# Patient Record
Sex: FEMALE | Race: ASIAN | NOT HISPANIC OR LATINO | Employment: FULL TIME | URBAN - METROPOLITAN AREA
[De-identification: names, ages, dates, MRNs, and addresses within clinical notes are randomized per-mention and may not be internally consistent; named-entity substitution may affect disease eponyms.]

---

## 2024-09-16 ENCOUNTER — OFFICE VISIT (OUTPATIENT)
Dept: OBGYN CLINIC | Facility: CLINIC | Age: 39
End: 2024-09-16
Payer: COMMERCIAL

## 2024-09-16 VITALS
HEIGHT: 60 IN | SYSTOLIC BLOOD PRESSURE: 138 MMHG | DIASTOLIC BLOOD PRESSURE: 87 MMHG | BODY MASS INDEX: 18.85 KG/M2 | WEIGHT: 96 LBS | HEART RATE: 84 BPM

## 2024-09-16 DIAGNOSIS — M65.4 DE QUERVAIN'S TENOSYNOVITIS, RIGHT: Primary | ICD-10-CM

## 2024-09-16 DIAGNOSIS — M79.641 RIGHT HAND PAIN: ICD-10-CM

## 2024-09-16 PROCEDURE — 99213 OFFICE O/P EST LOW 20 MIN: CPT | Performed by: PHYSICIAN ASSISTANT

## 2024-09-16 PROCEDURE — 20550 NJX 1 TENDON SHEATH/LIGAMENT: CPT | Performed by: PHYSICIAN ASSISTANT

## 2024-09-16 RX ORDER — LIDOCAINE HYDROCHLORIDE 10 MG/ML
0.5 INJECTION, SOLUTION INFILTRATION; PERINEURAL
Status: COMPLETED | OUTPATIENT
Start: 2024-09-16 | End: 2024-09-16

## 2024-09-16 RX ORDER — TRIAMCINOLONE ACETONIDE 40 MG/ML
40 INJECTION, SUSPENSION INTRA-ARTICULAR; INTRAMUSCULAR
Status: COMPLETED | OUTPATIENT
Start: 2024-09-16 | End: 2024-09-16

## 2024-09-16 RX ADMIN — LIDOCAINE HYDROCHLORIDE 0.5 ML: 10 INJECTION, SOLUTION INFILTRATION; PERINEURAL at 15:30

## 2024-09-16 RX ADMIN — TRIAMCINOLONE ACETONIDE 40 MG: 40 INJECTION, SUSPENSION INTRA-ARTICULAR; INTRAMUSCULAR at 15:30

## 2024-09-16 NOTE — PROGRESS NOTES
Orthopaedic Surgery - Office Note  Gunjan ALEXANDER (38 y.o. female)   : 1985   MRN: [id number]  Encounter Date: 2024    Chief Complaint   Patient presents with    Right Hand - Follow-up         Assessment/Plan  Diagnoses and all orders for this visit:    De Quervain's tenosynovitis, right    Right hand pain    Other orders  -     Hand/upper extremity injection    The diagnosis as well as treatment options were reviewed with the patient in the office today.  I would recommend icing the wrist 20 minutes on 1 hour off 3 times a day.  Patient will use a thumb spica brace to be worn at night and then during the day as desired for symptomatic activities.  Patient will start an oral anti-inflammatory such as Aleve 1 tablet twice daily with food stopping and calling if any stomach upset occurs.  In addition patient will consider topical anti-inflammatory gel such as Voltaren/diclofenac to be applied as directed on the over-the-counter product.  Patient will restart occupational therapy home exercise plan and may consider another referral for evaluation and treatment.    The risks and benefits of a cortisone injection were reviewed.  Shared decision making was utilized.  All question and concerns were reviewed and patient elected to undergo the injection, tolerating it well.  Patient did note significant decrease in pain symptoms postinjection and prior to leaving the exam room.    Patient will return for repeat evaluation in 6 weeks, earlier if symptoms are not improving with conservative care.  All question and concerns were answered in the office today.      Return for Recheck in 6 weeks with myself.        History of Present Illness  This is a previous patient seen by myself and diagnosed with de Quervain's tenosynovitis.  She wished to treat conservatively and was seen on 2020 for and 2024.  She did not receive a cortisone injection at either those visits.  She did attend occupational  "therapy, ice, use topical and oral anti-inflammatories.  The symptoms started after caring for a young child in April 2024.  No new injuries are reported.  Patient reports she would like to consider the injection as she has tried all the conservative options discussed at previous visits without relief of her symptoms.    Review of Systems  Pertinent items are noted in HPI.  All other systems were reviewed and are negative.    Physical Exam  /87 (BP Location: Left arm, Patient Position: Sitting, Cuff Size: Standard) Comment: has child w and child is stressed thinking its for him  Pulse 84   Ht 5' (1.524 m)   Wt 43.5 kg (96 lb)   BMI 18.75 kg/m²   Cons: Appears well.  No apparent distress.  Psych: Alert. Oriented x3.  Mood and affect normal.    Patient's right wrist is without skin breakdown lesion or signs of infection.  She is mildly tender through the first radial compartment with a mildly positive Finkelstein's test.  The rest of her hand and wrist exam is unremarkable with no tenderness to palpation either light or deep.  She has full active range of motion to wrist extension, flexion, radial deviation, supination, pronation, and ulnar deviation.   strength and pinch strength are 5 out of 5 but painful.  She has no tenderness in the anatomical snuffbox.  There is no crepitus through her range of motion.  She is nontender at the ulnar styloid.  Her elbow exam is unremarkable             Studies Reviewed  Previous orthopedic and Occupational Therapy notes were reviewed by myself in the office today    Hand/upper extremity injection: R extensor compartment 1  Universal Protocol:  Consent: Verbal consent obtained.  Risks and benefits: risks, benefits and alternatives were discussed  Consent given by: patient  Time out: Immediately prior to procedure a \"time out\" was called to verify the correct patient, procedure, equipment, support staff and site/side marked as required.  Patient understanding: patient " states understanding of the procedure being performed  Patient consent: the patient's understanding of the procedure matches consent given  Relevant documents: relevant documents present and verified  Test results: test results available and properly labeled  Site marked: the operative site was marked  Radiology Images displayed and confirmed. If images not available, report reviewed: imaging studies available  Patient identity confirmed: verbally with patient  Supporting Documentation  Indications: pain and tendon swelling   Procedure Details  Condition:de Quervain's tenosynovitis Site: R extensor compartment 1   Preparation: Patient was prepped and draped in the usual sterile fashion  Needle size: 22 G  Ultrasound guidance: no  Medications administered: 0.5 mL lidocaine 1 %; 40 mg triamcinolone acetonide 40 mg/mL  Patient tolerance: patient tolerated the procedure well with no immediate complications  Dressing:  Sterile dressing applied    Patient had complete resolution of pain symptoms postinjection and was quite happy.           Medical, Surgical, Family, and Social History  The patient's medical history, family history, and social history, were reviewed and updated as appropriate.    Past Medical History:   Diagnosis Date    Abdominal pain     Impaction of colon (HCC)        Past Surgical History:   Procedure Laterality Date    TOOTH EXTRACTION      Last assessed: 1/12/16       Family History   Adopted: Yes   Problem Relation Age of Onset    No Known Problems Mother     No Known Problems Father        Social History     Occupational History    Not on file   Tobacco Use    Smoking status: Never    Smokeless tobacco: Never   Substance and Sexual Activity    Alcohol use: Yes     Comment: rarely    Drug use: No    Sexual activity: Not on file       Allergies   Allergen Reactions    Pollen Extract Sneezing    Other      cat         Current Outpatient Medications:     acetaminophen (TYLENOL) 325 mg tablet, Take 2  tablets (650 mg total) by mouth every 4 (four) hours as needed for mild pain, Disp: 30 tablet, Rfl: 0    Bioflavonoid Products (ANCELMO C PO), Take by mouth, Disp: , Rfl:     dicyclomine (BENTYL) 10 mg capsule, Take 1 capsule (10 mg total) by mouth 3 (three) times a day before meals, Disp: 60 capsule, Rfl: 1    docusate sodium (COLACE) 100 mg capsule, Take 1 capsule (100 mg total) by mouth 2 (two) times a day, Disp: , Rfl: 0    ECHINACEA EXTRACT PO, Take by mouth, Disp: , Rfl:     famotidine (PEPCID) 40 MG tablet, Take 1 tablet (40 mg total) by mouth daily, Disp: 30 tablet, Rfl: 2    ibuprofen (MOTRIN) 200 mg tablet, Take 3 tablets (600 mg total) by mouth every 6 (six) hours as needed for moderate pain, Disp: , Rfl:     Probiotic Product (CULTURELLE PRO-WELL) CAPS, Take 1 capsule by mouth daily, Disp: , Rfl:     promethazine-dextromethorphan (PHENERGAN-DM) 6.25-15 mg/5 mL oral syrup, Take 5 mL by mouth 4 (four) times a day as needed for cough, Disp: 118 mL, Rfl: 0      Dandre Hodgson PA-C

## 2024-09-16 NOTE — PATIENT INSTRUCTIONS
"Patient Education     de Quervain tendinopathy   The Basics   Written by the doctors and editors at [facility]   What is de Quervain tendinopathy? -- de Quervain tendinopathy is a condition that causes pain in the thumb and wrist. It is caused by a problem with a tendon. Tendons are strong bands of tissue that connect muscles to bones. de Quervain tendinopathy is sometimes called \"de Quervain tenosynovitis.\"  de Quervain tendinopathy involves tendons that connect the forearm muscles to the thumb. These tendons and the covering around them get inflamed. This causes symptoms.  Most often, de Quervain tendinopathy happens when people use their wrist and thumb too much in certain ways. This includes gripping or grabbing objects (like a tool, golf club, or tennis racket) over and over. But, it can also happen to people for no obvious reason.  What are the symptoms of de Quervain tendinopathy? -- Symptoms include:   Pain in the wrist or thumb   Trouble gripping objects   Swelling in the wrist  Will I need tests? -- Probably not. Your doctor can usually tell if you have de Quervain tendinopathy by learning about your symptoms and doing an exam. During the exam, they will carefully check your thumb, hand, and wrist.  How is de Quervain tendinopathy treated? -- Treatment includes:   Resting your thumb - To avoid moving your thumb, you can wear a splint made for keeping the thumb still.   Ice - You can put a cold gel pack, bag of ice, or bag of frozen vegetables on the painful or swollen area every 4 to 6 hours, for 15 minutes each time.   Pain-relieving medicines called \"NSAIDs\" - NSAIDs are a large group of medicines that includes ibuprofen (sample brand names: Advil, Motrin) and naproxen (sample brand name: Aleve).   Exercises - After your symptoms improve, your doctor or nurse will show you exercises to help your wrist and thumb move more easily.  If your symptoms don't get better with treatment, your doctor might recommend " other treatments. These can include:   Getting a shot of a steroid medicine around the tendon in your wrist - This can help with pain.   Surgery to cut or loosen the covering around the tendon  All topics are updated as new evidence becomes available and our peer review process is complete.  This topic retrieved from [employer] on: Feb 26, 2024.  Topic 50447 Version 12.0  Release: 32.2.4 - C32.56  © 2024 UpToDate, Inc. and/or its affiliates. All rights reserved.  Consumer Information Use and Disclaimer   Disclaimer: This generalized information is a limited summary of diagnosis, treatment, and/or medication information. It is not meant to be comprehensive and should be used as a tool to help the user understand and/or assess potential diagnostic and treatment options. It does NOT include all information about conditions, treatments, medications, side effects, or risks that may apply to a specific patient. It is not intended to be medical advice or a substitute for the medical advice, diagnosis, or treatment of a health care provider based on the health care provider's examination and assessment of a patient's specific and unique circumstances. Patients must speak with a health care provider for complete information about their health, medical questions, and treatment options, including any risks or benefits regarding use of medications. This information does not endorse any treatments or medications as safe, effective, or approved for treating a specific patient. UpToDate, Inc. and its affiliates disclaim any warranty or liability relating to this information or the use thereof.The use of this information is governed by the Terms of Use, available at https://www.wolterskluwer.com/en/know/clinical-effectiveness-terms. 2024© UpToDate, Inc. and its affiliates and/or licensors. All rights reserved.  Copyright   © 2024 UpToDate, Inc. and/or its affiliates. All rights reserved.

## 2024-12-14 ENCOUNTER — HOSPITAL ENCOUNTER (OUTPATIENT)
Dept: RADIOLOGY | Facility: HOSPITAL | Age: 39
Discharge: HOME | End: 2024-12-14
Payer: COMMERCIAL

## 2024-12-14 DIAGNOSIS — N92.1 METRORRHAGIA: ICD-10-CM

## 2024-12-14 PROCEDURE — 76830 TRANSVAGINAL US NON-OB: CPT

## 2024-12-14 PROCEDURE — 76856 US EXAM PELVIC COMPLETE: CPT

## 2025-05-07 ENCOUNTER — TELEPHONE (OUTPATIENT)
Age: 40
End: 2025-05-07

## 2025-05-07 NOTE — TELEPHONE ENCOUNTER
"Who called:STAFF     Is the patient Pregnant ?No  If so, How many weeks? N/A    Reason for the Call:Insurance inquiry    Action Taken: Spoke with patient      Outcome/Plan/ Recommendations: Made patient aware we do not \"work with\" insurance plans, we have to be \"in network\" for the patient to be covered under her insurance. I did give the patient the tax ID and NPI numbers and patient stated she would call them back. Patient said they are a third party plan. Patient will keep appointment for now and cancel if she is not covered by her insurance.  "

## 2025-05-07 NOTE — TELEPHONE ENCOUNTER
NP called to schedule annual exam with Dr. Nina Muñoz. She is scheduled for NP yearly on 7/15/25.     Pt communicated having new insurance - [employer] Plan. Discussed calling insurance and providing our tax ID # and provider's NPI. Pt communicated she tried to verify coverage before and insurance company stated office needs to call to ensure provider will work with plan.     [employer]  Effective: 3/1/25  Tele# [phone]  Member ID# [id number]  Group # [id number]    She is requesting a c/b to verify insurance is okay for her upcoming appt. Thank you.

## 2025-06-02 ENCOUNTER — ULTRASOUND (OUTPATIENT)
Dept: OBGYN CLINIC | Facility: MEDICAL CENTER | Age: 40
End: 2025-06-02

## 2025-06-02 ENCOUNTER — OFFICE VISIT (OUTPATIENT)
Dept: OBGYN CLINIC | Facility: MEDICAL CENTER | Age: 40
End: 2025-06-02

## 2025-06-02 VITALS
DIASTOLIC BLOOD PRESSURE: 60 MMHG | BODY MASS INDEX: 20.2 KG/M2 | WEIGHT: 102.9 LBS | HEIGHT: 60 IN | SYSTOLIC BLOOD PRESSURE: 100 MMHG

## 2025-06-02 DIAGNOSIS — Z32.01 PREGNANCY EXAMINATION OR TEST, POSITIVE RESULT: Primary | ICD-10-CM

## 2025-06-02 DIAGNOSIS — Z36.9 ANTENATAL SCREENING ENCOUNTER: Primary | ICD-10-CM

## 2025-06-02 DIAGNOSIS — Z32.01 PREGNANCY EXAMINATION OR TEST, POSITIVE RESULT: ICD-10-CM

## 2025-06-02 RX ORDER — OMEGA-3S/DHA/EPA/FISH OIL/D3 300MG-1000
400 CAPSULE ORAL DAILY
COMMUNITY

## 2025-06-02 NOTE — PROGRESS NOTES
I  personally reviewed the images and that you agree with the findings.     EDC is LMP based 1/16/26  Sonogram - 1/18/25

## 2025-06-02 NOTE — PROGRESS NOTES
Procedures    Serial transvaginal images were obtained through the gravid maternal uterus. The patient's LMP was 4/11/2025 with an CARROLL of  1/16/2026 and a gestational age of  7w3d (based on LMP).   The indication for today's examination is to confirm fetal size and viability.    CRL=  1.04cm     7w1d    CARROLL by US 01/18/2025  YS=  5.8mm  FHR=  149bpm    The uterus demonstrates a tiny subchorionic bleed, less than 1cm, otherwise, uterus and right ovary appear within normal limits. The left ovary demonstrates a 2.2cm corpus luteal cyst. No free fluid.     Uterus= 9.52 x 6.09 x 6.24cm  Rt Ovary= 2.55 x 1.18 x 1.31cm  Lt. Ovary= 3.54 x 2.90 x 3.39cm    Impression: Single, viable IUP     Umm Nicolas RDMS    GE POSLavu F8 W3Db-IV transvaginal transducer Serial # [id number] was used to perform the examination today and subsequently followed with high level disinfection utilizing Trophon EPR procedure.     Ultrasound performed at:     [facility]  [address] [place], [place] [address]  [place] [address]  Phone:  [phone]  Fax:  [phone]

## 2025-06-20 ENCOUNTER — INITIAL PRENATAL (OUTPATIENT)
Dept: OBGYN CLINIC | Facility: MEDICAL CENTER | Age: 40
End: 2025-06-20

## 2025-06-20 VITALS
HEIGHT: 60 IN | SYSTOLIC BLOOD PRESSURE: 100 MMHG | WEIGHT: 104.6 LBS | BODY MASS INDEX: 20.54 KG/M2 | DIASTOLIC BLOOD PRESSURE: 58 MMHG

## 2025-06-20 DIAGNOSIS — Z34.81 PRENATAL CARE, SUBSEQUENT PREGNANCY, FIRST TRIMESTER: Primary | ICD-10-CM

## 2025-06-20 DIAGNOSIS — N39.3 STRESS INCONTINENCE IN PREGNANCY: ICD-10-CM

## 2025-06-20 DIAGNOSIS — O99.891 STRESS INCONTINENCE IN PREGNANCY: ICD-10-CM

## 2025-06-20 PROCEDURE — NOBC

## 2025-06-20 RX ORDER — DIPHENHYDRAMINE HYDROCHLORIDE 25 MG/1
25 CAPSULE ORAL DAILY
COMMUNITY

## 2025-06-20 NOTE — PATIENT INSTRUCTIONS
Congratulations on your pregnancy!  We thank you for allowing us to participate in your care.    NEXT STEPS    Go to the lab to have your prenatal bloodwork completed if you have not already done so.  There is a listing of [facility] and locations in your prenatal folder. You may also visit [email or web address] or call [phone].   Please be aware that some insurance companies may require you to go to a specific lab (ex. [email or web address] or [employer]). You can verify this by contacting your insurance company.   If you are interested in optional carrier screening for Cystic Fibrosis and Spinal Muscular Atrophy, cost will be based on your medical policy, deductible, and co-insurance. Billing is done directly with [employer] and your insurance.  If you have any additional questions, please reach out to [employer] directly [phone]. They may ask you for CPT codes,  CF is   Please have your blood work completed prior to you next prenatal visit.    If you have decided to have genetic testing done at [facility], that will be scheduled by [facility]. You may have already scheduled this appointment.  If not, please call their office to schedule this appointment.  Based on the referral placed by our office, they will know how to schedule you appropriately.    Contact information for [facility] is located in your prenatal folder. The main phone number to their office is [phone].    Return to our office for your first routine prenatal visit.   [facility] has multiple locations. Always check the address of your appointment to ensure you are going to the correct office.       Warning Signs During Pregnancy - If you experience any problems or concerns, call the office directly.  The list below includes warning signs your providers would like you to be aware of.  If you experience any of these at any time during your pregnancy, please call us as soon as possible.   Vaginal bleeding  Sharp abdominal pain  that does not go away  Fever (more than 100.4?F and is not relieved with Tylenol)  Persistent vomiting lasting greater than 24 hours  Chest pain/Shortness of breath  Pain or burning when you urinate     Call the OFFICE [phone] for any questions/emergencies.  At night or on the weekend, calls go through a triage service, please indicate it is an emergency and the DOCTOR on call will be paged.    Remember to only use [facility] for none urgent concerns or questions.    Our doctors deliver at [facility] in [place]. The address [place] [address]    Please click on the link below to review our Pregnancy Essential Guide.    [facility] Pregnancy Essentials Guide  [facility] Women's [facility] ([email or web address])     Click on the link below to review [facility] Lab locations.  [facility] Lab Locations    [employer] resource  [employer] is a tool to connect you to community resources you may need.

## 2025-06-20 NOTE — PROGRESS NOTES
OB INTAKE INTERVIEW 2025    Patient is 39 y.o. who presents for OB intake at 10w0d.  She is unaccompanied during this encounter.  The father of her baby (Leon) is involved in the pregnancy.      Patient's last menstrual period was 2025 (exact date).  Ultrasound: Measured 7 weeks 1 days on 2025  Estimated Date of Delivery: 26 confirmed by dating ultrasound.    Signs/Symptoms of Pregnancy  Current pregnancy symptoms: fatigue, nausea and occasional vomiting  Constipation no  Headaches no  Cramping/spotting no  PICA cravings no    Diabetes  Body mass index is 20.43 kg/m².  If patient has 1 or more, please order early 1 hour GTT  History of GDM no  BMI >35 no  History of PCOS or current metformin use no  History of LGA/macrosomic infant (4000g/9lbs) no    If patient has 2 or more, please order early 1 hour GTT  BMI>30 no  Patient's age 35 years or older as of estimated date of delivery (AMA) yes  First degree relative with type 2 diabetes - adopted  History of chronic HTN, hyperlipidemia, elevated A1C no  High risk race (, , ,  or ) yes    Hypertension  History of of chronic HTN no  History of gestational HTN no  History of preeclampsia, eclampsia, or HELLP syndrome no  History of diabetes no  History of lupus, sjogrens syndrome, kidney disease no    Thyroid  History of thyroid disease no    Bleeding Disorder or Hx of DVT (Factor V, antithrombin III, prothrombin gene mutation, protein C and S Ag, lupus anticoagulant, anticardiolipin, beta-2 glycoprotein): no    OB/GYN:  History of abnormal pap smear no       Date of last pap smear - patient reports   History of HPV no  History of Herpes/HSV h/o cold sores  History of other STI (gonorrhea, chlamydia, trich) no  History of prior  yes - VAVD  History of prior  no  History of  delivery prior to 36 weeks 6 days no  History of blood transfusion no  Ok for blood  transfusion yes    Substance screening:  History of tobacco use no  Currently using tobacco no  Substance Use Screen Level - no risk     MRSA Screening:   Does the pt have a hx of MRSA? no    Immunizations:  History of Varicella - as a child  Influenza vaccine given this season n/a  Discussed Tdap vaccine YES   COVID Vaccine x2    Genetic/M:  Do you or your partner have a history of any of the following in yourselves or first degree relatives?  Cystic fibrosis no  Spinal muscular atrophy no  Hemoglobinopathy/Sickle Cell/Thalassemia no  Fragile X Intellectual Disability no    Patient reports carrier screening completed with previous pregnancy. Both CF and SMA negative.    Appointment for Nuchal Translucency Ultrasound at [facility] is scheduled for 7/8.    Interview education  [facility] Book reviewed, discussed and attached to their AVS YES     Prenatal lab work scripts YES      Aspirin/Preeclampsia Screen    Risk Level Risk Factor Recommendation   LOW Prior Uncomplicated full-term delivery yes No Aspirin recommendation        MODERATE Nulliparity no Recommend low-dose aspirin if     BMI>30 no 2 or more moderate risk factors    Family History Preeclampsia (mother/sister) no - adopted     35yr old or greater as of CARROLL yes     Black Race, Concern for SDOH/Low Socioeconomic no     IVF Pregnancy  no     Personal History Risks (low birth weight, prior adverse preg outcome, >10yr preg interval) no         HIGH History of Preeclampsia no Recommend low-dose aspirin if     Multifetal gestation no 1 or more high risk factors    Chronic HTN no     Type 1 or 2 Diabetes no     Renal Disease no     Autoimmune Disease  no      Contraindications to ASA therapy:  NSAID/ ASA allergy: no  Nasal polyps: no  Asthma with history of ASA induced bronchospasm: no  Relative contraindications:  History of GI bleed: no  Active peptic ulcer disease: no  Severe hepatic dysfunction: no    Patient does not meet recommendation to  take ASA 162mg during this pregnancy from 12-36wks to lower her risk of preeclampsia.      The patient has a history now or in prior pregnancy notable for: VAVD and 3b laceration in 2022    Details that I feel the provider should be aware of: Gunjan came in for her OB intake at 10 weeks. Patient c/o fatigue, nausea and occasional vomiting. Safe medications to take during pregnancy and warning signs reviewed. Patient with h/o VAVD and 3b laceration in 2022. Encouraged to complete prenatal panel and early 1 hour glucose. Patient has NT scheduled with MFM on 7/8.    PN1 visit scheduled. The patient was oriented to our practice, the navigator role, reviewed delivering physicians and [facility] for delivery. All questions were answered.    Interviewed by: Rafa WEBB RN

## 2025-06-26 ENCOUNTER — APPOINTMENT (OUTPATIENT)
Dept: LAB | Facility: IMAGING CENTER | Age: 40
End: 2025-06-26
Payer: COMMERCIAL

## 2025-06-26 DIAGNOSIS — Z32.01 PREGNANCY EXAMINATION OR TEST, POSITIVE RESULT: ICD-10-CM

## 2025-06-26 LAB
ABO GROUP BLD: NORMAL
BASOPHILS # BLD AUTO: 0.05 THOUSANDS/ÂΜL (ref 0–0.1)
BASOPHILS NFR BLD AUTO: 1 % (ref 0–1)
BILIRUB UR QL STRIP: NEGATIVE
BLD GP AB SCN SERPL QL: NEGATIVE
CLARITY UR: NORMAL
COLOR UR: YELLOW
EOSINOPHIL # BLD AUTO: 0.18 THOUSAND/ÂΜL (ref 0–0.61)
EOSINOPHIL NFR BLD AUTO: 2 % (ref 0–6)
ERYTHROCYTE [DISTWIDTH] IN BLOOD BY AUTOMATED COUNT: 12.3 % (ref 11.6–15.1)
GLUCOSE 1H P 50 G GLC PO SERPL-MCNC: 110 MG/DL (ref 70–134)
GLUCOSE UR STRIP-MCNC: NEGATIVE MG/DL
HCT VFR BLD AUTO: 37.5 % (ref 34.8–46.1)
HGB BLD-MCNC: 13.1 G/DL (ref 11.5–15.4)
HGB UR QL STRIP.AUTO: NEGATIVE
IMM GRANULOCYTES # BLD AUTO: 0.04 THOUSAND/UL (ref 0–0.2)
IMM GRANULOCYTES NFR BLD AUTO: 0 % (ref 0–2)
KETONES UR STRIP-MCNC: NEGATIVE MG/DL
LEUKOCYTE ESTERASE UR QL STRIP: NEGATIVE
LYMPHOCYTES # BLD AUTO: 2.61 THOUSANDS/ÂΜL (ref 0.6–4.47)
LYMPHOCYTES NFR BLD AUTO: 28 % (ref 14–44)
MCH RBC QN AUTO: 32.1 PG (ref 26.8–34.3)
MCHC RBC AUTO-ENTMCNC: 34.9 G/DL (ref 31.4–37.4)
MCV RBC AUTO: 92 FL (ref 82–98)
MONOCYTES # BLD AUTO: 0.49 THOUSAND/ÂΜL (ref 0.17–1.22)
MONOCYTES NFR BLD AUTO: 5 % (ref 4–12)
NEUTROPHILS # BLD AUTO: 5.86 THOUSANDS/ÂΜL (ref 1.85–7.62)
NEUTS SEG NFR BLD AUTO: 64 % (ref 43–75)
NITRITE UR QL STRIP: NEGATIVE
NRBC BLD AUTO-RTO: 0 /100 WBCS
PH UR STRIP.AUTO: 7 [PH]
PLATELET # BLD AUTO: 327 THOUSANDS/UL (ref 149–390)
PMV BLD AUTO: 9.5 FL (ref 8.9–12.7)
PROT UR STRIP-MCNC: NEGATIVE MG/DL
RBC # BLD AUTO: 4.08 MILLION/UL (ref 3.81–5.12)
RH BLD: POSITIVE
RUBV IGG SERPL IA-ACNC: 43.8 IU/ML
SP GR UR STRIP.AUTO: 1.02 (ref 1–1.03)
SPECIMEN EXPIRATION DATE: NORMAL
UROBILINOGEN UR STRIP-ACNC: <2 MG/DL
WBC # BLD AUTO: 9.23 THOUSAND/UL (ref 4.31–10.16)

## 2025-06-26 PROCEDURE — 86901 BLOOD TYPING SEROLOGIC RH(D): CPT

## 2025-06-26 PROCEDURE — 36415 COLL VENOUS BLD VENIPUNCTURE: CPT

## 2025-06-26 PROCEDURE — 81003 URINALYSIS AUTO W/O SCOPE: CPT

## 2025-06-26 PROCEDURE — 82950 GLUCOSE TEST: CPT

## 2025-06-26 PROCEDURE — 87077 CULTURE AEROBIC IDENTIFY: CPT

## 2025-06-26 PROCEDURE — 83020 HEMOGLOBIN ELECTROPHORESIS: CPT

## 2025-06-26 PROCEDURE — 86706 HEP B SURFACE ANTIBODY: CPT

## 2025-06-26 PROCEDURE — 87086 URINE CULTURE/COLONY COUNT: CPT

## 2025-06-26 PROCEDURE — 86803 HEPATITIS C AB TEST: CPT

## 2025-06-26 PROCEDURE — 86900 BLOOD TYPING SEROLOGIC ABO: CPT

## 2025-06-26 PROCEDURE — 87186 SC STD MICRODIL/AGAR DIL: CPT

## 2025-06-26 PROCEDURE — 86780 TREPONEMA PALLIDUM: CPT

## 2025-06-26 PROCEDURE — 86762 RUBELLA ANTIBODY: CPT

## 2025-06-26 PROCEDURE — 86850 RBC ANTIBODY SCREEN: CPT

## 2025-06-26 PROCEDURE — 87389 HIV-1 AG W/HIV-1&-2 AB AG IA: CPT

## 2025-06-26 PROCEDURE — 87340 HEPATITIS B SURFACE AG IA: CPT

## 2025-06-26 PROCEDURE — 85025 COMPLETE CBC W/AUTO DIFF WBC: CPT

## 2025-06-27 LAB
HBV SURFACE AB SER-ACNC: <3 MIU/ML
HBV SURFACE AG SER QL: NORMAL
HCV AB SER QL: NORMAL
HIV 1+2 AB+HIV1 P24 AG SERPL QL IA: NORMAL
TREPONEMA PALLIDUM IGG+IGM AB [PRESENCE] IN SERUM OR PLASMA BY IMMUNOASSAY: NORMAL

## 2025-06-28 LAB
BACTERIA UR CULT: ABNORMAL
HGB A MFR BLD: 2.8 % (ref 1.8–3.2)
HGB A MFR BLD: 97.2 % (ref 96.4–98.8)
HGB F MFR BLD: 0 % (ref 0–2)
HGB FRACT BLD-IMP: NORMAL
HGB S MFR BLD: 0 %

## 2025-07-08 ENCOUNTER — ROUTINE PRENATAL (OUTPATIENT)
Age: 40
End: 2025-07-08
Attending: OBSTETRICS & GYNECOLOGY
Payer: COMMERCIAL

## 2025-07-08 ENCOUNTER — TELEPHONE (OUTPATIENT)
Dept: OBGYN CLINIC | Facility: CLINIC | Age: 40
End: 2025-07-08

## 2025-07-08 ENCOUNTER — ANCILLARY PROCEDURE (OUTPATIENT)
Age: 40
End: 2025-07-08
Attending: OBSTETRICS & GYNECOLOGY
Payer: COMMERCIAL

## 2025-07-08 VITALS
SYSTOLIC BLOOD PRESSURE: 104 MMHG | HEIGHT: 60 IN | HEART RATE: 83 BPM | DIASTOLIC BLOOD PRESSURE: 70 MMHG | BODY MASS INDEX: 20.97 KG/M2 | WEIGHT: 106.8 LBS

## 2025-07-08 DIAGNOSIS — Z3A.12 12 WEEKS GESTATION OF PREGNANCY: ICD-10-CM

## 2025-07-08 DIAGNOSIS — Z3A.12 12 WEEKS GESTATION OF PREGNANCY: Primary | ICD-10-CM

## 2025-07-08 DIAGNOSIS — O02.89 NON-VIABLE PREGNANCY: ICD-10-CM

## 2025-07-08 PROCEDURE — 76801 OB US < 14 WKS SINGLE FETUS: CPT | Performed by: OBSTETRICS & GYNECOLOGY

## 2025-07-08 PROCEDURE — 99243 OFF/OP CNSLTJ NEW/EST LOW 30: CPT | Performed by: OBSTETRICS & GYNECOLOGY

## 2025-07-08 NOTE — TELEPHONE ENCOUNTER
TELEPHONE CALL  [facility]      I spoke to the patient about her new diagnosis of pregnancy loss, embryo measuring 8 weeks.  - We discussed options for management including expectant management, medical management, and surgical management.  We discussed the risks and benefits of each approach.  - She opts for Surgical management with hospital D&E at this time.  - Medical Management: Mifepristone 200 mg orally followed by Misoprostol 800 mcg buccally 24 hours later was prescribed. We discussed the efficacy rates. We discussed that if she fails medical management, I would recommend surgical completion with dilation and evacuation.  - Surgical Management: We discussed the risks and benefits of surgical completion with D&E using IV conscious sedation.  We discussed the risks and benefits of surgical approach.  We discussed potential complications including anesthesia-related complications, uterine perforation, cervical trauma, and infection. We discussed the risk of intrauterine adhesions, which could lead to subsequent infertility, and can occur in 1.5-6% of patients after first trimester D&C.   - We discussed the option of cytogenetic analysis of products of conception. Patient is undecided at this time regarding genetics.  - Blood type is A positive.   - She was given precautions for heavy bleeding: e.g. bleeding that completely soaks two sanitary pads per hour for more than two hours or for pain that does not decrease after expulsion.      Halina Mays MD  [facility]  07/08/25 4:32 PM     96

## 2025-07-08 NOTE — LETTER
2025     Halina Mays MD  [address] [place] [address]  [address] [place] [address]    Patient: Gunjan Dominguez   YOB: 1985   Date of Visit: 2025       Dear Dr. Halina Mays MD:    Thank you for referring Gunjan Dominguez to me for evaluation. Below are my notes for this consultation.    If you have questions, please do not hesitate to call me. I look forward to following your patient along with you.         Sincerely,        Prashant Thomas MD        CC: No Recipients    Prashant Thomas MD  2025  2:57 PM  Sign when Signing Visit  Ms. Rose is a 39-year-old  2 para 1 patient who presents to the gestational age by LMP of 12 weeks and 4 days.    On an ultrasound performed in our office today and nonviable fetus was observed.    Crown-rump length corresponding to 8 weeks and 2 days.  No explanation for the fetal loss can be notified from the ultrasound examination.  Ms. Dominguez indicates has been presenting vaginal spotting for 1 week no cramping no fever.    I reviewed how there is a possibility of a genetic abnormality associated with pregnancy loss early in pregnancy and how it is unlikely that the tissue if retrieved , can grow but if possible,  could be sent for analysis to the laboratory, to rule out genetic abnormality as that information will be useful for counseling in the future pregnancy.    I offered our support and availability and indicated your office will be contacting her to discuss management plans and options which I briefly reviewed as spontaneous passage of the tissue, surgical evacuation of the uterus.    A full medical history was not obtained during the visit today.    I completed this visit in 20 minutes, 5 in previsit 10 in face-to-face counseling and 5 minutes in postvisit and coordination of plan of care.    Thank you for referring Ms. Dominguez to our care maternal-fetal medicine, do not hesitate to contact us if we can be of further  assistance.    Prashant Thomas MD, MSCE    Maternal-fetal medicine

## 2025-07-08 NOTE — PROGRESS NOTES
Ms. Rose is a 39-year-old  2 para 1 patient who presents to the gestational age by LMP of 12 weeks and 4 days.    On an ultrasound performed in our office today and nonviable fetus was observed.    Crown-rump length corresponding to 8 weeks and 2 days.  No explanation for the fetal loss can be notified from the ultrasound examination.  Ms. Dominguez indicates has been presenting vaginal spotting for 1 week no cramping no fever.    I reviewed how there is a possibility of a genetic abnormality associated with pregnancy loss early in pregnancy and how it is unlikely that the tissue if retrieved , can grow but if possible,  could be sent for analysis to the laboratory, to rule out genetic abnormality as that information will be useful for counseling in the future pregnancy.    I offered our support and availability and indicated your office will be contacting her to discuss management plans and options which I briefly reviewed as spontaneous passage of the tissue, surgical evacuation of the uterus.    A full medical history was not obtained during the visit today.    I completed this visit in 20 minutes, 5 in previsit 10 in face-to-face counseling and 5 minutes in postvisit and coordination of plan of care.    Thank you for referring Ms. Dominguez to our care maternal-fetal medicine, do not hesitate to contact us if we can be of further assistance.    Prashant Thomas MD, MSCE    Maternal-fetal medicine

## 2025-07-09 ENCOUNTER — TELEPHONE (OUTPATIENT)
Dept: OBGYN CLINIC | Facility: CLINIC | Age: 40
End: 2025-07-09

## 2025-07-09 NOTE — TELEPHONE ENCOUNTER
----- Message from Halina Mays MD sent at 2025  4:36 PM EDT -----  Regarding: D&E 8 weeks  Surgical Scheduling Request    Name: Gunjan Dominguez  : 1985  MRN: [id number]    Procedure: D&E 8 weeks, EUA  Diagnosis: Miscarriage    Anesthesia: conscious sedation  Admit: outpatient  Antibiotics: Doxycycline 200 mg IV  Special Needs / Equipment: none  Surgical PA or 2nd Surgeon Required?:  no    Preop Appt: DOS  Postop Appt: 1-2wk postop, virtual acceptable  Anticipated Leave Time: 2 days

## 2025-07-11 ENCOUNTER — OFFICE VISIT (OUTPATIENT)
Dept: OBGYN CLINIC | Facility: MEDICAL CENTER | Age: 40
End: 2025-07-11
Payer: COMMERCIAL

## 2025-07-11 ENCOUNTER — TELEPHONE (OUTPATIENT)
Dept: OBGYN CLINIC | Facility: CLINIC | Age: 40
End: 2025-07-11

## 2025-07-11 ENCOUNTER — NURSE TRIAGE (OUTPATIENT)
Age: 40
End: 2025-07-11

## 2025-07-11 VITALS
SYSTOLIC BLOOD PRESSURE: 112 MMHG | DIASTOLIC BLOOD PRESSURE: 60 MMHG | BODY MASS INDEX: 20.81 KG/M2 | WEIGHT: 106 LBS | HEIGHT: 60 IN

## 2025-07-11 DIAGNOSIS — O03.9 MISCARRIAGE: Primary | ICD-10-CM

## 2025-07-11 PROCEDURE — 99214 OFFICE O/P EST MOD 30 MIN: CPT | Performed by: STUDENT IN AN ORGANIZED HEALTH CARE EDUCATION/TRAINING PROGRAM

## 2025-07-11 NOTE — TELEPHONE ENCOUNTER
"REASON FOR CONVERSATION: Miscarriage    SYMPTOMS: Patient with confirmed non-viable pregnancy scheduled for D&E Monday 7/14. She is calling to report that she believes she passed tissue this morning. Notes vaginal bleeding and changing pad every 1-2 hrs for hygienes - pas are not full. Notes passing some clot up to the size of a quarter and reports that they are sort of loose. Reports passing what seemed to be a more firm clot that was slightly larger than her palm with pinkish tissue this morning. 5/10 pelvic cramping. Denies N/V/D, fever    OTHER HEALTH INFORMATION: Patient with confirmed non-viable pregnancy scheduled for D&E Monday 7/14 with Dr. Mays. Patient saved what she believes is the tissue and forming placenta - advised she put this in a refrigerator ASAP as she may be interested in genetic testing of the remains.    PROTOCOL DISPOSITION: Discuss with Provider and Call Back Patient (overriding See Today in Office)    CARE ADVICE PROVIDED: Monitor symptoms. Reviewed bleeding/ED precautions. Tylenol, rest, hydrate, heat. Reviewed symptoms may or may not get worse before they better    PRACTICE FOLLOW-UP: ESC and in basket message to Dr. Mays    Patient with questions as outlined below;    *Can she take NSAIDS at this time? Anesthesia told her no due to planned D&E but unsure she needs the procedure still.    *Asking about testing of tissue to determine any abnormalities or cause of loss. She is undecided if she would want to do this or options for care of the remains.    *Is she able to take benadryl? Anesthesia did not answer and advise she ask OBGYN.    *Plan moving forward with regard to D&E.          Reason for Disposition   Passed tissue (e.g., gray-white)    Answer Assessment - Initial Assessment Questions  1. SYMPTOM: \"What is your main concern right now?\" \"What questions do you have?\"      Vaginal bleeding changing pad every 2 hr, clots about the size of a quarter,  2. DIAGNOSIS " "CONFIRMATION: \"When was the threatened miscarriage (threatened ) diagnosed?\" \"By whom?\"      Confirmed loss  3. ULTRASOUND: \"Was an ultrasound performed at that time?\"  If Yes, ask: \"Do you know what it showed?\" (e.g., yes, no; estimated gestational age in weeks; presence of heartbeat)      completed  4. PREGNANCY: \"Do you know how many weeks or months pregnant you are?\" \"When was the first day of your last normal menstrual period?\"      13w0d  5. VAGINAL BLEEDING: \"Describe the bleeding that you are having.\" \"How much bleeding is there?\"       See #1  6. ABDOMEN PAIN: \"Do you have any abdomen pain?\"  If present, ask: \"How bad is it?\"  (e.g., Scale 1-10; mild, moderate, or severe)      5/10 cramping   7. HEMODYNAMIC STATUS: \"Are you weak or feeling lightheaded?\" If Yes, ask: \"Can you stand and walk normally?\"       Denies  8. OTHER SYMPTOMS: \"What other symptoms are you having with the bleeding?\" (e.g., passed tissue, vaginal discharge, fever, menstrual-type cramps, nausea, vomiting)      Believes she's passed tissue, appeared.    Denies N/V/D, fever    Protocols used:  - Threatened Miscarriage Follow-up Call-Adult-[place]    "

## 2025-07-14 NOTE — PROGRESS NOTES
Name: Gunjan Dominguez      : 1985      MRN: [id number]  Encounter Provider: Halina Mays MD  Encounter Date: 2025   Encounter department: [facility]  :  Assessment & Plan  Miscarriage  - Patient brought POC tissue in a ziploc bag on ice, passed around 7-8am this morning, requests genetic testing; consent forms signed and tissue transferred to the preferred medium, kit picked up from the office  - Ultrasound confirmed complete miscarriage with 13 mm endometrial stripe.  - Patient counseling provided/anticipatory guidance given  - offered grief counseling referral to therapy, patient declines    Orders:    CHROMOSOME ANALYSIS, POC, W/RFX TO SNP MICROARRAY        History of Present Illness   Erica started passing fetal tissue this morning.  Her bleeding has become lighter and the cramping has subsided.      Gunjan Dominguez is a 39 y.o. female who presents for miscarriage follow up.  History obtained from: patient    Review of Systems   Constitutional:  Negative for chills and fever.   Respiratory:  Negative for cough and shortness of breath.    Cardiovascular:  Negative for chest pain and leg swelling.   Gastrointestinal:  Negative for abdominal pain, nausea and vomiting.   Genitourinary:  Negative for dysuria, frequency and urgency.   Neurological:  Negative for dizziness, light-headedness and headaches.     Medical History Reviewed by provider this encounter:     .     Objective   /60   Ht 5' (1.524 m)   Wt 48.1 kg (106 lb)   LMP 2025 (Exact Date)   BMI 20.70 kg/m²      Physical Exam  Constitutional:       Appearance: Normal appearance.   HENT:      Head: Normocephalic.      Mouth/Throat:      Mouth: Mucous membranes are moist.     Cardiovascular:      Rate and Rhythm: Normal rate.   Pulmonary:      Effort: Pulmonary effort is normal.   Abdominal:      Comments: Soft, nontender     Musculoskeletal:      Cervical back: Normal range of  motion.     Skin:     General: Skin is warm and dry.     Neurological:      General: No focal deficit present.      Mental Status: She is alert.     Psychiatric:         Mood and Affect: Mood normal.       Bedside Ultrasound  Transabdominal approach  Interval absence of gestational sac and embryo  Thin endometrial stripe 13mm     Administrative Statements   I have spent a total time of 30 minutes in caring for this patient on the day of the visit/encounter including Diagnostic results, Instructions for management, and Counseling / Coordination of care.